# Patient Record
Sex: MALE | Race: BLACK OR AFRICAN AMERICAN | Employment: FULL TIME | URBAN - METROPOLITAN AREA
[De-identification: names, ages, dates, MRNs, and addresses within clinical notes are randomized per-mention and may not be internally consistent; named-entity substitution may affect disease eponyms.]

---

## 2022-06-25 ENCOUNTER — HOSPITAL ENCOUNTER (EMERGENCY)
Age: 32
Discharge: ANOTHER HOSPITAL | End: 2022-06-25
Attending: EMERGENCY MEDICINE
Payer: COMMERCIAL

## 2022-06-25 VITALS
SYSTOLIC BLOOD PRESSURE: 178 MMHG | RESPIRATION RATE: 16 BRPM | HEIGHT: 71 IN | BODY MASS INDEX: 33.25 KG/M2 | DIASTOLIC BLOOD PRESSURE: 99 MMHG | OXYGEN SATURATION: 100 % | TEMPERATURE: 97.9 F | WEIGHT: 237.5 LBS | HEART RATE: 52 BPM

## 2022-06-25 DIAGNOSIS — H57.12 PAIN AROUND LEFT EYE: Primary | ICD-10-CM

## 2022-06-25 PROCEDURE — 6370000000 HC RX 637 (ALT 250 FOR IP): Performed by: EMERGENCY MEDICINE

## 2022-06-25 PROCEDURE — 99285 EMERGENCY DEPT VISIT HI MDM: CPT

## 2022-06-25 RX ORDER — TETRACAINE HYDROCHLORIDE 5 MG/ML
1 SOLUTION OPHTHALMIC ONCE
Status: COMPLETED | OUTPATIENT
Start: 2022-06-25 | End: 2022-06-25

## 2022-06-25 RX ORDER — GLIMEPIRIDE 2 MG/1
1 TABLET ORAL 2 TIMES DAILY
Status: DISCONTINUED | OUTPATIENT
Start: 2022-06-25 | End: 2022-06-25 | Stop reason: HOSPADM

## 2022-06-25 RX ADMIN — FLUORESCEIN SODIUM 1 MG: 1 STRIP OPHTHALMIC at 13:50

## 2022-06-25 RX ADMIN — TIMOLOL MALEATE 1 DROP: 2.5 SOLUTION OPHTHALMIC at 14:54

## 2022-06-25 RX ADMIN — TETRACAINE HYDROCHLORIDE 1 DROP: 5 SOLUTION OPHTHALMIC at 13:50

## 2022-06-25 ASSESSMENT — PAIN SCALES - GENERAL: PAINLEVEL_OUTOF10: 1

## 2022-06-25 ASSESSMENT — PAIN - FUNCTIONAL ASSESSMENT: PAIN_FUNCTIONAL_ASSESSMENT: 0-10

## 2022-06-25 ASSESSMENT — PAIN DESCRIPTION - DESCRIPTORS: DESCRIPTORS: DISCOMFORT

## 2022-06-25 ASSESSMENT — PAIN DESCRIPTION - PAIN TYPE: TYPE: ACUTE PAIN

## 2022-06-25 ASSESSMENT — PAIN DESCRIPTION - FREQUENCY: FREQUENCY: INTERMITTENT

## 2022-06-25 ASSESSMENT — PAIN DESCRIPTION - ORIENTATION: ORIENTATION: LEFT

## 2022-06-25 ASSESSMENT — VISUAL ACUITY
OS: 20/15
OU: 20/13
OD: 20/15

## 2022-06-25 ASSESSMENT — PAIN DESCRIPTION - LOCATION: LOCATION: EYE

## 2022-06-25 NOTE — ED NOTES
Report called to [facility] at [facility] ER and pt left with paperwork in hand and verbalized understanding of going straight to  ER. Pt left in no acute distress and ambulated out of ER to lobby without difficulty.       Raj Bruner RN  06/25/22 3974

## 2022-06-25 NOTE — ED NOTES
Pt states that he was cutting down a tree yesterday and a piece of the tree got into his left eye and he has been having redness and discomfort since the injury.       José Miguel Tirado RN  06/25/22 1052

## 2022-06-25 NOTE — ED NOTES
Dr El Cabrera has spoke with Susana and they would like pt to go to [facility] ER for further evaluation.       Louis Felix RN  06/25/22 7405

## 2022-06-25 NOTE — ED PROVIDER NOTES
Emergency Department Provider Note  Location: [address] [place] [address]  6/25/2022     Patient Identification  Candy Elias is a 28 y.o. male    Chief Complaint  Eye Problem (left eye)          HPI  (History provided by patient)  Patient is a 22-year-old male with history of reported glaucoma who presents with right-sided eye pain and sensitivity to light and intermittent blurry vision over the past 24 hours. No exacerbating alleviating factors. Patient reports that he works as a  and few days ago had been cutting wood and may have gotten a speck of wood in his eye but otherwise denies any trauma. Reports that since then he has no irritation at the surface of his eye but has an achy sensation within the eye. No pain with extraocular movement. No nausea no vomiting no fevers or chills. Reports similar episode few years ago and was diagnosed with glaucoma given to him all eyedrops along with other multiple eyedrops which improved his symptoms. I have reviewed the following nursing documentation:  Allergies: No Known Allergies    Past medical history:  has no past medical history on file. Past surgical history:  has a past surgical history that includes Hand surgery. Home medications:   Prior to Admission medications    Medication Sig Start Date End Date Taking? Authorizing Provider   Zolpidem Tartrate (AMBIEN PO) Take by mouth as needed   Yes Historical Provider, MD       Social history:  reports that he has never smoked. He has never used smokeless tobacco. He reports current alcohol use of about 1.0 standard drink of alcohol per week. He reports previous drug use. Family history:  History reviewed. No pertinent family history. ROS  Review of Systems   Constitutional: Negative for chills and fever. HENT: Negative for congestion and rhinorrhea. Eyes: Positive for photophobia, pain and visual disturbance.    Respiratory: Negative for cough, shortness of breath and wheezing. Cardiovascular: Negative for chest pain and palpitations. Gastrointestinal: Negative for abdominal pain, diarrhea, nausea and vomiting. Genitourinary: Negative for dysuria and hematuria. Musculoskeletal: Negative for back pain and neck pain. Skin: Negative for rash and wound. Neurological: Negative for syncope and weakness. Psychiatric/Behavioral: Negative for agitation and confusion. Exam  ED Triage Vitals   BP Temp Temp src Pulse Resp SpO2 Height Weight   -- -- -- -- -- -- -- --       Physical Exam  Vitals and nursing note reviewed. Constitutional:       General: He is not in acute distress. Appearance: He is well-developed. HENT:      Head: Normocephalic and atraumatic. Nose: Nose normal. No congestion. Eyes:      Extraocular Movements: Extraocular movements intact. Comments: Pupils are equal and round, the left is sluggishly reactive. Mildly injected, no evidence of cell and flare in the anterior chamber. There is no proptosis. Fluorescein exam shows no evidence of corneal abrasion. Howard-Pen malfunction difficulty obtaining accurate read, at of approximately 8 reads the average pressure was 35 mmHg in the affected eye compared to 20 in the unaffected eye. Negative Margarita sign. Unable to assess optic disks on funduscopy   Cardiovascular:      Rate and Rhythm: Normal rate and regular rhythm. Heart sounds: No murmur heard. Pulmonary:      Effort: Pulmonary effort is normal.      Breath sounds: Normal breath sounds. Abdominal:      General: There is no distension. Palpations: Abdomen is soft. Tenderness: There is no abdominal tenderness. Musculoskeletal:         General: No deformity. Normal range of motion. Cervical back: Normal range of motion and neck supple. Skin:     General: Skin is warm. Findings: No rash. Neurological:      Mental Status: He is alert and oriented to person, place, and time.       Motor: No abnormal muscle tone. Coordination: Coordination normal.   Psychiatric:         Mood and Affect: Mood normal.         Behavior: Behavior normal.           ED Course    ED Medication Orders (From admission, onward)    Start Ordered     Status Ordering Provider    06/25/22 1345 06/25/22 1340  tetracaine (TETRAVISC) 0.5 % ophthalmic solution 1 drop  ONCE         Last MAR action: Given - by Deangelo Jackson on 06/25/22 at [address], [facility] [place]    06/25/22 1345 06/25/22 1340  fluorescein ophthalmic strip 1 mg  ONCE         Last MAR action: Given - by Deangelo Jackson on 06/25/22 at [address], [address]            Radiology  No results found. Labs  No results found for this visit on 06/25/22. [facility]  Patient seen and evaluated. Relevant records reviewed. 58-year-old male who presents for left-sided eye pain redness and mild photophobia and some waxing and waning blurry vision in the affected eye. On exam he is in no acute distress his vitals are notable for elevated blood pressure otherwise within normal limits. His exam is concerning for developing glaucoma. Also considered uveitis/iritis given his story of possibly being hit with a foreign body while cutting wood. His anterior chamber appears clear but his pupil is sluggishly reactive. Questionable pressure on Howard-Pen due to device malfunction but appears to be elevated compared to contralateral side. Patient did have some relief after administering tetracaine eyedrops which would support more of an iritis type picture. In any case given his history of glaucoma I did feel that urgent/emergent ophthalmology evaluation was indicated. I consulted [facility] ophthalmology and discussed with them by phone who agrees needs to be seen and recommends transfer to the emergency department at [facility]. I then discussed the case with [facility] attending ER who agrees to accept transfer for further care.   Patient having no visual changes at this time feels comfortable driving as he drove here. We will send him by private vehicle. Clinical Impression:  1. Pain around left eye          Disposition:  Transfer to [address] [place] [address] to ER in stable condition. Blood pressure (!) 178/99, pulse 52, temperature 97.9 °F (36.6 °C), temperature source Oral, resp. rate 16, height 5' 11\" (1.803 m), weight 237 lb 8 oz (107.7 kg), SpO2 100 %. Patient was given scripts for the following medications. I counseled patient how to take these medications. Discharge Medication List as of 6/25/2022  3:50 PM          Disposition referral (if applicable):  No follow-up provider specified. Total critical care time is 10 minutes, which excludes separately billable procedures and updating family. Time spent is specifically for management of the presenting complaint and symptoms initially, direct bedside care, reevaluation, review of records, and consultation. There was a high probability of clinically significant life-threatening deterioration in the patient's condition, which required my urgent intervention. This chart was generated in part by using Dragon Dictation system and may contain errors related to that system including errors in grammar, punctuation, and spelling, as well as words and phrases that may be inappropriate. If there are any questions or concerns please feel free to contact the dictating provider for clarification.      MD Shawn Ewing MD  06/26/22 9703

## 2022-06-26 ASSESSMENT — ENCOUNTER SYMPTOMS
PHOTOPHOBIA: 1
DIARRHEA: 0
ABDOMINAL PAIN: 0
COUGH: 0
NAUSEA: 0
WHEEZING: 0
VOMITING: 0
SHORTNESS OF BREATH: 0
BACK PAIN: 0
EYE PAIN: 1
RHINORRHEA: 0